# Patient Record
Sex: MALE | Race: WHITE | NOT HISPANIC OR LATINO | Employment: FULL TIME | URBAN - METROPOLITAN AREA
[De-identification: names, ages, dates, MRNs, and addresses within clinical notes are randomized per-mention and may not be internally consistent; named-entity substitution may affect disease eponyms.]

---

## 2022-03-16 ENCOUNTER — OFFICE VISIT (OUTPATIENT)
Dept: URGENT CARE | Facility: CLINIC | Age: 22
End: 2022-03-16
Payer: COMMERCIAL

## 2022-03-16 VITALS
WEIGHT: 138 LBS | SYSTOLIC BLOOD PRESSURE: 124 MMHG | HEIGHT: 71 IN | DIASTOLIC BLOOD PRESSURE: 72 MMHG | HEART RATE: 92 BPM | OXYGEN SATURATION: 100 % | RESPIRATION RATE: 16 BRPM | TEMPERATURE: 99.1 F | BODY MASS INDEX: 19.32 KG/M2

## 2022-03-16 DIAGNOSIS — S29.011A STRAIN OF LEFT PECTORALIS MUSCLE, INITIAL ENCOUNTER: Primary | ICD-10-CM

## 2022-03-16 PROCEDURE — 99203 OFFICE O/P NEW LOW 30 MIN: CPT | Performed by: FAMILY MEDICINE

## 2022-03-16 NOTE — PROGRESS NOTES
[address]        NAME: Garima Bella is a 19 y o  male  : 2000    MRN: [id number]  DATE: 2022  TIME: 3:15 PM    Assessment and Plan   Strain of left pectoralis muscle, initial encounter [S29 011A]  1  Strain of left pectoralis muscle, initial encounter  Diclofenac Sodium (Voltaren) 1 %         Patient Instructions       Follow up with PCP in 3-5 days  Proceed to  ER if symptoms worsen  Chief Complaint     Chief Complaint   Patient presents with    Clavicle Pain     Pt reports left clavicle pain x2 weeks  History of Present Illness       49-year-old male with pain in the posterior aspect the left shoulder and over the anterior aspect of the left chest wall  Patient reports pain starting approximately 2 weeks ago 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  He states at that time then his pain in the shoulder resolved but then noticed pain beginning just under his left clavicle after a weekend of golfing  Pain is reproduced with taking deep breaths and with reaching his arm behind his back  Denies any shortness of breath, chest tightness or palpitations  Review of Systems   Review of Systems   Constitutional: Negative  HENT: Negative  Eyes: Negative  Respiratory: Negative  Cardiovascular: Negative  Gastrointestinal: Negative  Genitourinary: Negative  Musculoskeletal: Positive for arthralgias and myalgias  Skin: Negative  Allergic/Immunologic: Negative  Neurological: Negative  Hematological: Negative  Psychiatric/Behavioral: Negative            Current Medications       Current Outpatient Medications:     Diclofenac Sodium (Voltaren) 1 %, Apply 2 g topically 4 (four) times a day, Disp: 150 g, Rfl: 1    Current Allergies     Allergies as of 2022    (No Known Allergies)            The following portions of the patient's history were reviewed and updated as appropriate: allergies, current medications, past family history, past medical history, past social history, past surgical history and problem list      Past Medical History:   Diagnosis Date    Patient denies medical problems        History reviewed  No pertinent surgical history  No family history on file  Medications have been verified  Objective   /72   Pulse 92   Temp 99 1 °F (37 3 °C)   Resp 16   Ht 5' 11" (1 803 m)   Wt 62 6 kg (138 lb)   SpO2 100%   BMI 19 25 kg/m²   No LMP for male patient  Physical Exam     Physical Exam  Constitutional:       Appearance: He is well-developed  HENT:      Head: Normocephalic  Eyes:      Pupils: Pupils are equal, round, and reactive to light  Pulmonary:      Effort: Pulmonary effort is normal    Musculoskeletal:         General: No swelling or signs of injury  Normal range of motion  Left shoulder: Tenderness present  No swelling  Normal range of motion  Normal strength  Left upper arm: No swelling, edema or bony tenderness  Arms:       Cervical back: Normal range of motion  Comments: Palpable tenderness along the left pectoralis major muscle groups reproduced with shoulder extension and abduction  Skin:     General: Skin is warm  Neurological:      Mental Status: He is alert and oriented to person, place, and time

## 2022-10-12 ENCOUNTER — OCCMED (OUTPATIENT)
Dept: URGENT CARE | Facility: CLINIC | Age: 22
End: 2022-10-12
Payer: OTHER MISCELLANEOUS

## 2022-10-12 ENCOUNTER — APPOINTMENT (OUTPATIENT)
Dept: RADIOLOGY | Facility: CLINIC | Age: 22
End: 2022-10-12
Payer: OTHER MISCELLANEOUS
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
## 2022-10-12 DIAGNOSIS — M25.562 ACUTE PAIN OF LEFT KNEE: Primary | ICD-10-CM

## 2022-10-12 DIAGNOSIS — M25.562 ACUTE PAIN OF LEFT KNEE: ICD-10-CM

## 2022-10-12 PROCEDURE — 99213 OFFICE O/P EST LOW 20 MIN: CPT | Performed by: PHYSICIAN ASSISTANT

## 2022-10-12 PROCEDURE — 73564 X-RAY EXAM KNEE 4 OR MORE: CPT

## 2022-10-17 ENCOUNTER — OCCMED (OUTPATIENT)
Dept: URGENT CARE | Facility: CLINIC | Age: 22
End: 2022-10-17
Payer: OTHER MISCELLANEOUS
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
## 2022-10-17 PROCEDURE — 99213 OFFICE O/P EST LOW 20 MIN: CPT | Performed by: PHYSICIAN ASSISTANT

## 2022-10-17 NOTE — PROGRESS NOTES
This encounter was created for [facility] orders only   [address]        NAME: Yobani Holt is a 23 y o  male  : 2000    MRN: [id number]  DATE: 2022  TIME: 2:25 PM    There were no vitals taken for this visit  Assessment and Plan   No primary diagnosis found  No diagnosis found  Patient Instructions       Follow up with PCP in 3-5 days  Proceed to  ER if symptoms worsen  Chief Complaint   No chief complaint on file  History of Present Illness       HPI    Review of Systems   Review of Systems      Current Medications       Current Outpatient Medications:   •  Diclofenac Sodium (Voltaren) 1 %, Apply 2 g topically 4 (four) times a day, Disp: 150 g, Rfl: 1    Current Allergies     Allergies as of 10/17/2022   • (No Known Allergies)            The following portions of the patient's history were reviewed and updated as appropriate: allergies, current medications, past family history, past medical history, past social history, past surgical history and problem list      Past Medical History:   Diagnosis Date   • Patient denies medical problems        No past surgical history on file  No family history on file  Medications have been verified  Objective   There were no vitals taken for this visit         Physical Exam     Physical Exam

## 2022-10-20 VITALS
BODY MASS INDEX: 19.32 KG/M2 | DIASTOLIC BLOOD PRESSURE: 80 MMHG | WEIGHT: 138 LBS | HEART RATE: 82 BPM | SYSTOLIC BLOOD PRESSURE: 120 MMHG | HEIGHT: 71 IN

## 2022-10-20 DIAGNOSIS — M23.92 LOCKING OF LEFT KNEE: ICD-10-CM

## 2022-10-20 DIAGNOSIS — M25.562 KNEE MENISCUS PAIN, LEFT: ICD-10-CM

## 2022-10-20 DIAGNOSIS — M23.92 ACUTE INTERNAL DERANGEMENT OF LEFT KNEE: ICD-10-CM

## 2022-10-20 DIAGNOSIS — M25.562 ACUTE PAIN OF LEFT KNEE: Primary | ICD-10-CM

## 2022-10-20 PROCEDURE — 99213 OFFICE O/P EST LOW 20 MIN: CPT | Performed by: PHYSICIAN ASSISTANT

## 2022-10-20 NOTE — LETTER
October 20, 2022     Patient: Paty Pritchard  YOB: 2000  Date of Visit: 10/20/2022      To Whom it May Concern:    Paty Pritchard is under my professional care  Donna President was seen in my office on 10/20/2022  Donna President may return to work with limitations :  no kneeling bending squatting at work  May wear brace  If you have any questions or concerns, please don't hesitate to call           Sincerely,          Deepika Norris PA-C        CC: No Recipients

## 2022-10-20 NOTE — PATIENT INSTRUCTIONS
Meniscus Tear   WHAT YOU NEED TO KNOW:   What is a meniscus tear? A meniscus tear is a tear in the cartilage of your knee  The meniscus is a piece of cartilage (strong tissue) between your thighbone and shinbone  The meniscus helps to cushion your knee joint and keep it stable  What causes a meniscus tear? A meniscus tear can occur if you twist your knee  A meniscus tear can happen during sports that involve squatting and twisting the knee, such as football or basketball  Weak and thinned meniscus cartilage in older people can increase the risk for a meniscus tear  What are the signs and symptoms of a meniscus tear? A pop or tear when the injury happens    Pain and swelling    Tenderness    Stiffness    Popping, catching, or locking of your knee    Not being able to extend your knee fully    How is a meniscus tear diagnosed? Your healthcare provider will examine your knee  He may bend your knee and then straighten and rotate it  He may also order x-rays and an MRI of your knee  An MRI takes pictures of your knee to show the meniscus tear  You may be given contrast liquid to help the tear show up better  Tell the healthcare provider if you have ever had an allergic reaction to contrast liquid  Do not enter the MRI room with anything metal  Metal can cause serious injury  Tell the healthcare provider if you have any metal in or on your body  How is a meniscus tear treated? Treatment depends on the type of tear you have  Some types of meniscus tears can heal on their own  You may need any of the following:  NSAIDs , such as ibuprofen, help decrease swelling, pain, and fever  This medicine is available with or without a doctor's order  NSAIDs can cause stomach bleeding or kidney problems in certain people  If you take blood thinner medicine, always ask if NSAIDs are safe for you  Always read the medicine label and follow directions   Do not give these medicines to children under 10months of age without direction from your child's healthcare provider  Rest  your knee  Avoid activities that make the swelling or pain worse  You may need to avoid putting weight on your leg while you have pain  Your healthcare provider may recommend that you use crutches  Apply ice  on your knee for 15 to 20 minutes every hour or as directed  Use an ice pack, or put crushed ice in a plastic bag  Cover it with a towel  Ice helps prevent tissue damage and decreases swelling and pain  Compress  your knee with an elastic bandage, air cast, medical boot, or splint to reduce swelling  Ask your healthcare provider which compression device to use, and how tight it should be  Elevate  your knee above the level of your heart as often as you can  This will help decrease swelling and pain  Prop your knee on pillows or blankets to keep it elevated comfortably  Surgery  may be needed if your symptoms do not improve  Your healthcare provider may trim away or repair damaged tissue  Call 911 for any of the following: Your arm or leg feels warm, tender, and painful  It may look swollen and red  When should I seek immediate care? You cannot move your knee at all  When should I contact my healthcare provider? Your symptoms do not improve with treatment  You have questions or concerns about your condition or care  CARE AGREEMENT:   You have the right to help plan your care  Learn about your health condition and how it may be treated  Discuss treatment options with your healthcare providers to decide what care you want to receive  You always have the right to refuse treatment  The above information is an  only  It is not intended as medical advice for individual conditions or treatments  Talk to your doctor, nurse or pharmacist before following any medical regimen to see if it is safe and effective for you    © Copyright [employer] 2022 Information is for End User's use only and may not be sold, redistributed or otherwise used for commercial purposes   All illustrations and images included in CareNotes® are the copyrighted property of A D A M , Inc  or [facility] Anh Yi

## 2022-10-20 NOTE — PROGRESS NOTES
Orthopaedic Surgery - Office Note  Rosalba Paula (23 y o  male)   : 2000   MRN: [id number]  Encounter Date: 10/20/2022    Chief Complaint   Patient presents with   • Left Knee - Pain         Assessment/Plan  Diagnoses and all orders for this visit:    Acute pain of left knee  -     MRI knee left  wo contrast; Future    Acute internal derangement of left knee  -     MRI knee left  wo contrast; Future    Locking of left knee  -     MRI knee left  wo contrast; Future    Knee meniscus pain, left  -     MRI knee left  wo contrast; Future    I reviewed with the patient in the office today his subjective complaints are concerning for a lateral meniscus tear that may be locking on him in a bucket-handle meniscal pattern  As he currently has an effusion but not a locked knee I will schedule him for the MRI to evaluate a lateral meniscus tear  In the interim he will be started in physical therapy to begin working on his quad strength and improve his range of motion and decrease the swelling  He will continue use of the hinged knee brace for comfort and support  He will be placed on light duty restrictions of avoiding kneeling bending and squatting while wearing the brace at work  He will return to discuss the MRI with orthopedic surgeon but I did review with him concern for a lateral meniscus tear     Return for Recheck with Dr Conner Galan or Alvaro Morris to discuss MRI of the left knee  Alannah Jordan History of Present Illness  This is a new patient with left knee pain  Patient reports he has had on and off knee pain for approximately 3 years  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  He reports that over the past 3 years on occasion he would get a sharp pain in the outside of his knee which resulted in all locked sensation and difficulty fully extending or flexing the knee  He reports the knee would eventually pop and he would regain full motion and strength of the knee    He reports that last Wednesday on 10/12/2022 the knee locked again and cause significant pain and discomfort with inability to fully extend the knee  He reports the knee remained locked and difficulty bearing weight until over the weekend when taking his brace off he felt like the knee gave out and he was then able to regain full motion of the knee  He he reports he was seen at [facility] in [place] and they told him he had a lateral collateral ligament sprain(records are unavailable at this time from that visit)  Currently patient reports pain on the outside aspect of the knee as well as associated swelling  I asked the patient in the office today if this was a work related injury and he states "it did lock at work but that has been happening for a long time "    Review of Systems  Pertinent items are noted in HPI  All other systems were reviewed and are negative  Physical Exam  /80   Pulse 82   Ht 5' 11" (1 803 m)   Wt 62 6 kg (138 lb)   BMI 19 25 kg/m²   Cons: Appears well  No apparent distress  Psych: Alert  Oriented x3  Mood and affect normal   Eyes: PERRLA, EOMI  Resp: Normal effort  No audible wheezing or stridor  CV: Palpable pulse  No discernable arrhythmia  Lymph:  No palpable cervical, axillary, or inguinal lymphadenopathy  Skin: Warm  No palpable masses  No visible lesions  Neuro: Normal muscle tone  Normal and symmetric DTR's  Patient's left knee has a 1+ intra-articular effusion  There is tenderness on the lateral joint line with a positive lateral Waldemar's  He has no medial joint line tenderness  He has a negative medial Waldemar's  He has no pain or instability with valgus and varus stressing at full extension and 20° of flexion  His quad strength is decreased to 4/5 with mild quad atrophy noted    He has mild patellar apprehension without significant retropatellar crepitus throughout a range of motion that has full extension and flexes to 110° with end motion discomfort  He has no calf tenderness and a negative Homans  He has no instability or pain to Lachman's and posterior drawer  His extensor mechanism is intact  His gait is heel-to-toe in the office today  Studies Reviewed  Study Result    Narrative & Impression   LEFT KNEE     INDICATION:   M25 562: Pain in left knee      COMPARISON:  None     VIEWS:  XR KNEE 4+ VW LEFT INJURY         FINDINGS:     There is no acute fracture or dislocation      There is no joint effusion      No significant degenerative changes      No lytic or blastic osseous lesion      Soft tissues are unremarkable      IMPRESSION:     No acute osseous abnormality            Workstation performed: [id number]   X-ray images as well as reports were reviewed by myself and I agree with radiologist interpretation  Urgent care notes and emergency department notes were reviewed by myself in the office today-they are incomplete at time of office visit without information        Procedures  No procedures today  Medical, Surgical, Family, and Social History  The patient's medical history, family history, and social history, were reviewed and updated as appropriate  Past Medical History:   Diagnosis Date   • Patient denies medical problems        History reviewed  No pertinent surgical history  History reviewed  No pertinent family history      Social History     Occupational History   • Not on file   Tobacco Use   • Smoking status: Current Every Day Smoker   • Smokeless tobacco: Current User   Substance and Sexual Activity   • Alcohol use: Not on file   • Drug use: Not on file   • Sexual activity: Not on file       No Known Allergies      Current Outpatient Medications:   •  Diclofenac Sodium (Voltaren) 1 %, Apply 2 g topically 4 (four) times a day, Disp: 150 g, Rfl: 1      Aidan Burch PA-C

## 2022-10-24 ENCOUNTER — APPOINTMENT (OUTPATIENT)
Dept: URGENT CARE | Facility: CLINIC | Age: 22
End: 2022-10-24
Payer: OTHER MISCELLANEOUS

## 2022-10-24 PROCEDURE — 99213 OFFICE O/P EST LOW 20 MIN: CPT | Performed by: PHYSICIAN ASSISTANT

## 2022-11-02 ENCOUNTER — HOSPITAL ENCOUNTER (OUTPATIENT)
Dept: MRI IMAGING | Facility: HOSPITAL | Age: 22
Discharge: HOME | End: 2022-11-02

## 2022-11-02 DIAGNOSIS — M25.562 ACUTE PAIN OF LEFT KNEE: ICD-10-CM

## 2022-11-02 DIAGNOSIS — M25.562 KNEE MENISCUS PAIN, LEFT: ICD-10-CM

## 2022-11-02 DIAGNOSIS — M23.92 ACUTE INTERNAL DERANGEMENT OF LEFT KNEE: ICD-10-CM

## 2022-11-02 DIAGNOSIS — M23.92 LOCKING OF LEFT KNEE: ICD-10-CM

## 2022-11-07 ENCOUNTER — APPOINTMENT (OUTPATIENT)
Dept: URGENT CARE | Facility: CLINIC | Age: 22
End: 2022-11-07

## 2022-11-08 VITALS
DIASTOLIC BLOOD PRESSURE: 68 MMHG | WEIGHT: 146 LBS | HEIGHT: 71 IN | BODY MASS INDEX: 20.44 KG/M2 | HEART RATE: 69 BPM | SYSTOLIC BLOOD PRESSURE: 116 MMHG

## 2022-11-08 DIAGNOSIS — S83.422A SPRAIN OF LATERAL COLLATERAL LIGAMENT OF LEFT KNEE, INITIAL ENCOUNTER: Primary | ICD-10-CM

## 2022-11-08 NOTE — ASSESSMENT & PLAN NOTE
Findings consistent with left knee sprain  I reviewed the patient's left knee MRI and discussed with him that there is no evidence of internal derangement and appears to be relatively healthy knee  There is some evidence of increased intensity on T2 fat saturated which is indicative of bony contusion following his mechanism of injury  I discussed with the patient that he should continue to weightbear as tolerated and continue to use his knee brace for comfort instability  He should begin formal physical therapy for hip and thigh strengthening and range of motion  I did provide him with updated work restrictions limiting repetitive use of his leg, avoiding squatting, kneeling, climbing, and a 20 lb lifting restriction  He is to follow up in approximately 4 weeks for re-evaluation  All patient's questions were answered to his satisfaction  This note is created using dictation transcription  It may contain typographical errors, grammatical errors, improperly dictated words, background noise and other errors

## 2022-11-08 NOTE — PROGRESS NOTES
Assessment:     1  Sprain of lateral collateral ligament of left knee, initial encounter        Plan:     Problem List Items Addressed This Visit        Musculoskeletal and Integument    Sprain of lateral collateral ligament of left knee - Primary     Findings consistent with left knee sprain  I reviewed the patient's left knee MRI and discussed with him that there is no evidence of internal derangement and appears to be relatively healthy knee  There is some evidence of increased intensity on T2 fat saturated which is indicative of bony contusion following his mechanism of injury  I discussed with the patient that he should continue to weightbear as tolerated and continue to use his knee brace for comfort instability  He should begin formal physical therapy for hip and thigh strengthening and range of motion  I did provide him with updated work restrictions limiting repetitive use of his leg, avoiding squatting, kneeling, climbing, and a 20 lb lifting restriction  He is to follow up in approximately 4 weeks for re-evaluation  All patient's questions were answered to his satisfaction  This note is created using dictation transcription  It may contain typographical errors, grammatical errors, improperly dictated words, background noise and other errors  Subjective:     Patient ID: Ridge Richard is a 23 y o  male  Chief Complaint:  78-year-old male presents today for initial evaluation left knee pain referred by Naomy Anna PA-C  He states approximately 1 month ago while at work he twisted and felt a pop in the back of his knee  He states after this injury he felt significant pain throughout the lateral posterior aspect of his knee as well as had increased swelling  He states that he did wear a knee brace as well as was compliant with crutches which did improve his symptoms, until a week later he reaggravated his knee    He states since this the pain has improved slightly and the swelling has improved significantly  He is now weight-bearing as tolerated without the use of crutches, he has used a knee brace that does provide comfort and support  He states his pain is improved with rest, ice, over-the-counter medication  He has not started formal physical therapy yet, mentions that he will started next week for 2 to 3 times a week  He denies any previous history of injury or trauma to his knee  He denies any numbness or tingling  Information on patient's intake form was reviewed  Allergy:  No Known Allergies  Medications:  all current active meds have been reviewed  Past Medical History:  Past Medical History:   Diagnosis Date   • Patient denies medical problems      Past Surgical History:  History reviewed  No pertinent surgical history  Family History:  History reviewed  No pertinent family history  Social History:  Social History     Substance and Sexual Activity   Alcohol Use None     Social History     Substance and Sexual Activity   Drug Use Not on file     Social History     Tobacco Use   Smoking Status Current Every Day Smoker   Smokeless Tobacco Current User     Review of Systems   Constitutional: Negative for chills, fever and unexpected weight change  HENT: Negative for hearing loss, nosebleeds and sore throat  Eyes: Negative for pain, redness and visual disturbance  Respiratory: Negative for cough and shortness of breath  Cardiovascular: Negative for chest pain, palpitations and leg swelling  Gastrointestinal: Negative for abdominal pain, nausea and vomiting  Endocrine: Negative for polyphagia and polyuria  Genitourinary: Negative for dysuria and hematuria  Musculoskeletal: Positive for arthralgias (Left knee) and joint swelling (Left knee)  Skin: Negative for rash and wound  Neurological: Negative for dizziness, numbness and headaches  Psychiatric/Behavioral: Negative for decreased concentration and suicidal ideas  The patient is not nervous/anxious  Objective:  BP Readings from Last 1 Encounters:   11/08/22 116/68      Wt Readings from Last 1 Encounters:   11/08/22 66 2 kg (146 lb)      BMI:   Estimated body mass index is 20 36 kg/m² as calculated from the following:    Height as of this encounter: 5' 11" (1 803 m)  Weight as of this encounter: 66 2 kg (146 lb)  BSA:   Estimated body surface area is 1 84 meters squared as calculated from the following:    Height as of this encounter: 5' 11" (1 803 m)  Weight as of this encounter: 66 2 kg (146 lb)  Physical Exam  Vitals and nursing note reviewed  Constitutional:       Appearance: Normal appearance  He is well-developed  HENT:      Head: Normocephalic and atraumatic  Right Ear: External ear normal       Left Ear: External ear normal       Nose: Nose normal    Eyes:      Extraocular Movements: Extraocular movements intact  Conjunctiva/sclera: Conjunctivae normal    Pulmonary:      Effort: Pulmonary effort is normal    Musculoskeletal:      Cervical back: Neck supple  Left knee: Effusion (Trace) present  Instability Tests: Medial Waldemar test negative and lateral Waldemar test negative  Comments: See HPI   Skin:     General: Skin is warm and dry  Neurological:      Mental Status: He is alert and oriented to person, place, and time  Sensory: No sensory deficit  Psychiatric:         Mood and Affect: Mood normal          Behavior: Behavior normal        Left Knee Exam     Tenderness   The patient is experiencing tenderness in the LCL (Lateral hamstring)  Range of Motion   The patient has normal left knee ROM      Tests   Waldemar:  Medial - negative Lateral - negative  Varus: negative Valgus: negative  Lachman:  Anterior - negative    Posterior - negative  Drawer:  Anterior - negative     Posterior - negative  Pivot shift: negative  Patellar apprehension: negative    Other   Erythema: absent  Scars: absent  Sensation: normal  Pulse: present  Swelling: none  Effusion: effusion (Trace) present    Comments:  Negative dial test            I have personally reviewed pertinent films in PACS and my interpretation is MRI of left knee performed on 11/02/2020 were reviewed with the patient during today's visit demonstrate no evidence of internal derangement  There is slight bone edema on the lateral tibial plateau and lateral femoral condyle indicative of bony contusion following pivoting mechanism

## 2022-11-08 NOTE — LETTER
November 8, 2022     Patient: Valerie Farrell  YOB: 2000  Date of Visit: 11/8/2022      To Whom it May Concern:    Valerie Farrell is under my professional care  Julianna Elizondo was seen in my office on 11/8/2022  Julianna Elizondo may return to work with limitations 11/8/2022 with limitation of no squatting, kneeling, or climbing until next follow up  If you have any questions or concerns, please don't hesitate to call           Sincerely,          Demetrice Riley MD        CC: No Recipients

## 2022-12-02 NOTE — TELEPHONE ENCOUNTER
Caller: Lay from [facility]    Doctor: Frida Martinez    Reason for call: Calling to give [facility] claim information   Send claims to:  Farhan Martinez [address], [address]  DOI-10/12/22-left knee  -Jacquie Wang-[phone]    Call back#: [phone] Vascular Surgery

## 2022-12-06 VITALS
WEIGHT: 150 LBS | BODY MASS INDEX: 21 KG/M2 | SYSTOLIC BLOOD PRESSURE: 120 MMHG | HEIGHT: 71 IN | DIASTOLIC BLOOD PRESSURE: 74 MMHG

## 2022-12-06 DIAGNOSIS — S83.422D SPRAIN OF LATERAL COLLATERAL LIGAMENT OF LEFT KNEE, SUBSEQUENT ENCOUNTER: Primary | ICD-10-CM

## 2022-12-06 NOTE — ASSESSMENT & PLAN NOTE
Findings consistent with left knee sprain overall doing well  Patient reports overall he is doing better  At this point patient can finish out physical therapy, wear knee brace as needed for support  He will be released back to normal work duties  See patient back as needed  All patient's questions were answered to his satisfaction  This note is created using dictation transcription  It may contain typographical errors, grammatical errors, improperly dictated words, background noise and other errors

## 2022-12-06 NOTE — PROGRESS NOTES
Assessment:     1  Sprain of lateral collateral ligament of left knee, subsequent encounter        Plan:     Problem List Items Addressed This Visit        Musculoskeletal and Integument    Sprain of lateral collateral ligament of left knee - Primary     Findings consistent with left knee sprain overall doing well  Patient reports overall he is doing better  At this point patient can finish out physical therapy, wear knee brace as needed for support  He will be released back to normal work duties  See patient back as needed  All patient's questions were answered to his satisfaction  This note is created using dictation transcription  It may contain typographical errors, grammatical errors, improperly dictated words, background noise and other errors  Subjective:     Patient ID: Stephen Arias is a 23 y o  male  Chief Complaint:  23 yr old male in for follow up of his left knee lateral collateral ligament strain, bone contusion sustained at work in October  MRI reviewed at last appt  Patient is wearing hinged knee brace for support  He has started physical therapy which has been beneficial  He states his leg is getting stronger and no longer has pain lateral aspect of knee  Denies any locking or instability  Working with restrictions  Allergy:  No Known Allergies  Medications:  all current active meds have been reviewed  Past Medical History:  Past Medical History:   Diagnosis Date   • Patient denies medical problems      Past Surgical History:  History reviewed  No pertinent surgical history  Family History:  History reviewed  No pertinent family history  Social History:  Social History     Substance and Sexual Activity   Alcohol Use None     Social History     Substance and Sexual Activity   Drug Use Not on file     Social History     Tobacco Use   Smoking Status Every Day   Smokeless Tobacco Current     Review of Systems   Constitutional: Negative for chills and fever     HENT: Negative for ear pain and sore throat  Eyes: Negative for pain and visual disturbance  Respiratory: Negative for cough and shortness of breath  Cardiovascular: Negative for chest pain and palpitations  Gastrointestinal: Negative for abdominal pain and vomiting  Genitourinary: Negative for dysuria and hematuria  Musculoskeletal: Negative for arthralgias, back pain, gait problem and joint swelling  Skin: Negative for color change and rash  Neurological: Negative for seizures and syncope  Psychiatric/Behavioral: Negative  All other systems reviewed and are negative  Objective:  BP Readings from Last 1 Encounters:   12/06/22 120/74      Wt Readings from Last 1 Encounters:   12/06/22 68 kg (150 lb)      BMI:   Estimated body mass index is 20 92 kg/m² as calculated from the following:    Height as of this encounter: 5' 11" (1 803 m)  Weight as of this encounter: 68 kg (150 lb)  BSA:   Estimated body surface area is 1 87 meters squared as calculated from the following:    Height as of this encounter: 5' 11" (1 803 m)  Weight as of this encounter: 68 kg (150 lb)  Physical Exam  Vitals and nursing note reviewed  Constitutional:       Appearance: Normal appearance  He is well-developed and well-nourished  HENT:      Head: Normocephalic and atraumatic  Right Ear: External ear normal       Left Ear: External ear normal    Eyes:      Extraocular Movements: Extraocular movements intact  Conjunctiva/sclera: Conjunctivae normal    Pulmonary:      Effort: Pulmonary effort is normal    Musculoskeletal:      Cervical back: Neck supple  Left knee: No effusion  Instability Tests: Medial Waldemar test negative and lateral Waldemar test negative  Skin:     General: Skin is warm and dry  Neurological:      Mental Status: He is alert and oriented to person, place, and time  Deep Tendon Reflexes: Reflexes are normal and symmetric     Psychiatric:         Mood and Affect: Mood and affect and mood normal          Behavior: Behavior normal        Left Knee Exam     Muscle Strength   The patient has normal left knee strength  Tenderness   The patient is experiencing no tenderness       Range of Motion   Extension: normal   Flexion: normal     Tests   Waldemar:  Medial - negative Lateral - negative  Varus: negative Valgus: negative  Patellar apprehension: negative    Other   Erythema: absent  Scars: absent  Sensation: normal  Pulse: present  Swelling: none  Effusion: no effusion present            no new imaging to review     Scribe Attestation    I,:  Jose M Pereira am acting as a scribe while in the presence of the attending physician :       I,:  Rose Mary Jennings MD personally performed the services described in this documentation    as scribed in my presence :